# Patient Record
Sex: FEMALE | URBAN - METROPOLITAN AREA
[De-identification: names, ages, dates, MRNs, and addresses within clinical notes are randomized per-mention and may not be internally consistent; named-entity substitution may affect disease eponyms.]

---

## 2019-06-11 ENCOUNTER — OFFICE VISIT (OUTPATIENT)
Dept: ORTHOPEDIC SURGERY | Age: 44
End: 2019-06-11

## 2019-06-11 VITALS
DIASTOLIC BLOOD PRESSURE: 87 MMHG | SYSTOLIC BLOOD PRESSURE: 133 MMHG | RESPIRATION RATE: 21 BRPM | HEART RATE: 91 BPM | WEIGHT: 140.8 LBS

## 2019-06-11 DIAGNOSIS — M48.02 CERVICAL STENOSIS OF SPINAL CANAL: Primary | ICD-10-CM

## 2019-06-11 RX ORDER — DICLOFENAC SODIUM 10 MG/G
GEL TOPICAL 4 TIMES DAILY
COMMUNITY

## 2019-06-11 RX ORDER — NAPROXEN 500 MG/1
500 TABLET, DELAYED RELEASE ORAL 2 TIMES DAILY WITH MEALS
Qty: 60 TAB | Refills: 2 | Status: SHIPPED | OUTPATIENT
Start: 2019-06-11

## 2019-06-11 NOTE — PROGRESS NOTES
[facility]  [address] Iglesias [address] [facility]  Phone: [phone]  Fax: [phone]  INITIAL CONSULTATION  Patient: Jorge Santamaria                MRN: [id number]       SSN: [id number]  YOB: 1975        AGE: 37 y.o. SEX: female  There is no height or weight on file to calculate BMI. PCP: Edie King,   06/11/19    Chief Complaint   Patient presents with    Neck Pain     NEW PATIENT         HISTORY OF PRESENT ILLNESS, RADIOGRAPHS, and PLAN:         HISTORY OF PRESENT ILLNESS:  Ms. Jarrett Jean Baptiste is seen today at the request of [address] [place] [address]. Ms. Jarrett Jean Baptiste is a 42-year-old female with a history of insulin-dependent diabetes on an insulin pump and also anxiety. Since the beginning of the year and approximately November, she has had neck pain and a sense of swelling in her left posterior neck with radiation into her shoulders. She feels her balance has been off and her gait has been off some also dexterity. PHYSICAL EXAMINATION:  Her physical exam demonstrates normal strength, sensation, and reflexes. Her tandem gait is slightly altered. MRI of her cervical spine demonstrates cervical spondylosis with mild to moderate stenosis due to degenerative changes at C5-6. She has had no physical therapy or conservative treatment. ASSESSMENT/PLAN: My recommendation in her situation given the really mild nature of her pathology radiographically, would be to consider conservative care. I would get her on some nonsteroidal anti-inflammatory medications and physical therapy and see how she does and just evaluate her going forward. I am not convinced this is a myelopathic condition. The stenosis is simply too mild radiographically. I think her gait instability is a chronic issue. It may be related to her diabetes.       I will see her back in four to six weeks to monitor her progress with physical therapy. cc: Keyana Leon M.D. History reviewed. No pertinent past medical history. History reviewed. No pertinent family history. Current Outpatient Medications   Medication Sig Dispense Refill    diclofenac (VOLTAREN) 1 % gel Apply  to affected area four (4) times daily. Allergies   Allergen Reactions    Ciprofloxacin Hives    Erythromycin Unknown (comments)       Past Surgical History:   Procedure Laterality Date    HX  SECTION      HX CHOLECYSTECTOMY      HX OOPHORECTOMY Left     HX TUBAL LIGATION         History reviewed. No pertinent past medical history.     Social History     Socioeconomic History    Marital status:      Spouse name: Not on file    Number of children: Not on file    Years of education: Not on file    Highest education level: Not on file   Occupational History    Not on file   Social Needs    Financial resource strain: Not on file    Food insecurity:     Worry: Not on file     Inability: Not on file    Transportation needs:     Medical: Not on file     Non-medical: Not on file   Tobacco Use    Smoking status: Never Smoker    Smokeless tobacco: Never Used   Substance and Sexual Activity    Alcohol use: Not on file    Drug use: Not on file    Sexual activity: Not on file   Lifestyle    Physical activity:     Days per week: Not on file     Minutes per session: Not on file    Stress: Not on file   Relationships    Social connections:     Talks on phone: Not on file     Gets together: Not on file     Attends Shinto service: Not on file     Active member of club or organization: Not on file     Attends meetings of clubs or organizations: Not on file     Relationship status: Not on file    Intimate partner violence:     Fear of current or ex partner: Not on file     Emotionally abused: Not on file     Physically abused: Not on file     Forced sexual activity: Not on file   Other Topics Concern   [employer] Service Not Asked    Blood Transfusions Not Asked    Caffeine Concern Not Asked    Occupational Exposure Not Asked    Hobby Hazards Not Asked    Sleep Concern Not Asked    Stress Concern Not Asked    Weight Concern Not Asked    Special Diet Not Asked    Back Care Not Asked    Exercise Not Asked    Bike Helmet Not Asked   [address] Not Asked    Self-Exams Not Asked   Social History Narrative    Not on file           REVIEW OF SYSTEMS:   CONSTITUTIONAL SYMPTOMS:  Negative. EYES:  Negative. EARS, NOSE, THROAT AND MOUTH:  Negative. CARDIOVASCULAR:  Negative. RESPIRATORY:  Negative. GENITOURINARY: Per HPI. GASTROINTESTINAL:  Per HPI. INTEGUMENTARY (SKIN AND/OR BREAST):  Negative. MUSCULOSKELETAL: Per HPI.   ENDOCRINE/RHEUMATOLOGIC:  Negative. NEUROLOGICAL:  Per HPI. HEMATOLOGIC/LYMPHATIC:  Negative. ALLERGIC/IMMUNOLOGIC:  Negative. PSYCHIATRIC:  Negative. PHYSICAL EXAMINATION:   Visit Vitals  /87 (BP 1 Location: Left arm, BP Patient Position: Sitting)   Pulse 91   Resp 21   Wt 140 lb 12.8 oz (63.9 kg)    PAIN SCALE: 6/10    CONSTITUTIONAL: The patient is in no apparent distress and is alert and oriented x 3. HEENT: Normocephalic. Hearing grossly intact. NECK: Supple and symmetric. no tenderness, or masses were felt. RESPIRATORY: No labored breathing. CARDIOVASCULAR: The carotid pulses were normal. Peripheral pulses were 2+. CHEST: Normal AP diameter and normal contour without any kyphoscoliosis. LYMPHATIC: No lymphadenopathy was appreciated in the neck, axillae or groin. SKIN:  Negative for scars, rashes, lesions, or ulcers on the right upper, right lower, left upper, left lower and trunk. NEUROLOGICAL: Alert and oriented x 3. Ambulation without assistive device. FWB. Imbalance. EXTREMITIES:  See musculoskeletal.  MUSCULOSKELETAL:   Head and Neck: Left sided neck pain. Headaches. B/L shoulder pain.  Negative for misalignment, asymmetry, crepitation, defects, tenderness masses or effusions.  Left Upper Extremity: Loss of dexterity. Numbness in fingers. Inspection, percussion and palpation performed. De Leons sign is negative.  Right Upper Extremity: Loss of dexterity. Numbness in fingers. Inspection, percussion and palpation performed. De Leons sign is negative.  Spine, Ribs and Pelvis: Inspection, percussion and palpation performed. Negative for misalignment, asymmetry, crepitation, defects, tenderness masses or effusions.  Left Lower Extremity: Inspection, percussion and palpation performed. Negative straight leg raise.  Right Lower Extremity: Inspection, percussion and palpation performed. Negative straight leg raise. SPINE EXAM:     Cervical spine: Neck is midline. Normal muscle tone. No focal atrophy is noted. Lumbar spine: No rash, ecchymosis, or gross obliquity. No focal atrophy is noted. ASSESSMENT    ICD-10-CM ICD-9-CM    1. Cervical stenosis of spinal canal M48.02 723.0 naproxen EC (NAPROSYN EC) 500 mg EC tablet      REFERRAL TO PHYSICAL THERAPY       Written by Roula Saenz, as dictated by Lucinda Pelayo MD.    I, Dr. Lucinda Pelayo MD, confirm that all documentation is accurate.

## 2019-06-17 ENCOUNTER — HOSPITAL ENCOUNTER (OUTPATIENT)
Dept: PHYSICAL THERAPY | Age: 44
End: 2019-06-17